# Patient Record
Sex: FEMALE | Race: WHITE | NOT HISPANIC OR LATINO
[De-identification: names, ages, dates, MRNs, and addresses within clinical notes are randomized per-mention and may not be internally consistent; named-entity substitution may affect disease eponyms.]

---

## 2018-09-18 PROBLEM — Z00.00 ENCOUNTER FOR PREVENTIVE HEALTH EXAMINATION: Status: ACTIVE | Noted: 2018-09-18

## 2018-10-02 ENCOUNTER — APPOINTMENT (OUTPATIENT)
Dept: PSYCHIATRY | Facility: CLINIC | Age: 28
End: 2018-10-02
Payer: COMMERCIAL

## 2018-10-02 PROCEDURE — 90791 PSYCH DIAGNOSTIC EVALUATION: CPT

## 2018-10-16 ENCOUNTER — NON-APPOINTMENT (OUTPATIENT)
Age: 28
End: 2018-10-16

## 2018-10-16 ENCOUNTER — APPOINTMENT (OUTPATIENT)
Dept: CARDIOLOGY | Facility: CLINIC | Age: 28
End: 2018-10-16
Payer: MEDICAID

## 2018-10-16 VITALS
BODY MASS INDEX: 19.63 KG/M2 | SYSTOLIC BLOOD PRESSURE: 102 MMHG | HEIGHT: 64 IN | DIASTOLIC BLOOD PRESSURE: 70 MMHG | HEART RATE: 64 BPM | WEIGHT: 115 LBS

## 2018-10-16 DIAGNOSIS — Z87.891 PERSONAL HISTORY OF NICOTINE DEPENDENCE: ICD-10-CM

## 2018-10-16 DIAGNOSIS — Z82.49 FAMILY HISTORY OF ISCHEMIC HEART DISEASE AND OTHER DISEASES OF THE CIRCULATORY SYSTEM: ICD-10-CM

## 2018-10-16 DIAGNOSIS — Z78.9 OTHER SPECIFIED HEALTH STATUS: ICD-10-CM

## 2018-10-16 PROCEDURE — 99203 OFFICE O/P NEW LOW 30 MIN: CPT

## 2018-10-16 PROCEDURE — 93000 ELECTROCARDIOGRAM COMPLETE: CPT

## 2018-10-16 RX ORDER — NORGESTIMATE AND ETHINYL ESTRADIOL 7DAYSX3 28
0.18/0.215/0.25 KIT ORAL
Refills: 0 | Status: ACTIVE | COMMUNITY

## 2018-12-18 DIAGNOSIS — F43.8 OTHER REACTIONS TO SEVERE STRESS: ICD-10-CM

## 2019-01-14 ENCOUNTER — APPOINTMENT (OUTPATIENT)
Dept: CARDIOLOGY | Facility: CLINIC | Age: 29
End: 2019-01-14

## 2019-01-22 ENCOUNTER — APPOINTMENT (OUTPATIENT)
Dept: CARDIOLOGY | Facility: CLINIC | Age: 29
End: 2019-01-22

## 2019-05-16 PROBLEM — F43.8 ADJUSTMENT REACTION TO CHRONIC STRESS: Status: ACTIVE | Noted: 2018-10-02

## 2019-10-15 ENCOUNTER — APPOINTMENT (OUTPATIENT)
Dept: CARDIOLOGY | Facility: CLINIC | Age: 29
End: 2019-10-15
Payer: COMMERCIAL

## 2019-10-15 ENCOUNTER — NON-APPOINTMENT (OUTPATIENT)
Age: 29
End: 2019-10-15

## 2019-10-15 VITALS
DIASTOLIC BLOOD PRESSURE: 60 MMHG | BODY MASS INDEX: 18.78 KG/M2 | SYSTOLIC BLOOD PRESSURE: 108 MMHG | HEIGHT: 64 IN | OXYGEN SATURATION: 98 % | WEIGHT: 110 LBS | HEART RATE: 77 BPM

## 2019-10-15 DIAGNOSIS — Z78.9 OTHER SPECIFIED HEALTH STATUS: ICD-10-CM

## 2019-10-15 PROCEDURE — 99214 OFFICE O/P EST MOD 30 MIN: CPT | Mod: 25

## 2019-10-15 PROCEDURE — 93000 ELECTROCARDIOGRAM COMPLETE: CPT

## 2019-10-15 NOTE — REASON FOR VISIT
[Consultation] : a consultation regarding [FreeTextEntry1] : Historical Perspective:\par 29 year old female with past medical hx of migraines. Pt has had on and off chest pain anteriorly for 3 years.  It has become  more frequent in last 2 months. Chest pain varies in duration and frequency.  Mild 2-3/10, nonradiating. Intensity remains unchanged.  Feels tight or pressure like.  She could have symptoms for few days or then not at all.  It resolves on its own.  It occurs with stress and can occur with climbing stairs.  Also, dyspnea with climbing stairs which resolves quickly. \par \par She also has palpitations.   Described as extra beats or fluttering in chest.   It feels like "dripping sensation down sternum to epigastric area.  It can last minutes and a few episodes have lasted a while.   No associated chest pain, dizziness, syncope.  It does not feel like rapid heart rate. \par \par Current Health Status:\par Patient continues with chest pain and fluttering in her chest. She did not follow through with testing last year. She returns and would like testing.  [FreeTextEntry2] : chest pain

## 2019-10-15 NOTE — PHYSICAL EXAM
[Normal Appearance] : normal appearance [General Appearance - Well Developed] : well developed [General Appearance - Well Nourished] : well nourished [Normal Conjunctiva] : the conjunctiva exhibited no abnormalities [Eyelids - No Xanthelasma] : the eyelids demonstrated no xanthelasmas [No Oral Pallor] : no oral pallor [Normal Oral Mucosa] : normal oral mucosa [No Oral Cyanosis] : no oral cyanosis [Respiration, Rhythm And Depth] : normal respiratory rhythm and effort [FreeTextEntry1] : No JVD, no carotid artery bruits auscultated bilaterally [Auscultation Breath Sounds / Voice Sounds] : lungs were clear to auscultation bilaterally [Exaggerated Use Of Accessory Muscles For Inspiration] : no accessory muscle use [Heart Rate And Rhythm] : heart rate and rhythm were normal [Edema] : no peripheral edema present [Murmurs] : no murmurs present [Heart Sounds] : normal S1 and S2 [Abdomen Mass (___ Cm)] : no abdominal mass palpated [Abnormal Walk] : normal gait [Nail Clubbing] : no clubbing of the fingernails [Gait - Sufficient For Exercise Testing] : the gait was sufficient for exercise testing [Cyanosis, Localized] : no localized cyanosis [Petechial Hemorrhages (___cm)] : no petechial hemorrhages [Skin Turgor] : normal skin turgor [Skin Color & Pigmentation] : normal skin color and pigmentation [] : no rash [Impaired Insight] : insight and judgment were intact [Oriented To Time, Place, And Person] : oriented to person, place, and time [No Anxiety] : not feeling anxious

## 2019-10-15 NOTE — REVIEW OF SYSTEMS
[Lower Ext Edema] : no extremity edema [see HPI] : see HPI [Leg Claudication] : no intermittent leg claudication [Negative] : Heme/Lymph

## 2019-10-15 NOTE — DISCUSSION/SUMMARY
[FreeTextEntry1] : 1. Palpitations: patient with family history of SVT. I am recommending 24 hour Holter Monitor. Recommend echocardiogram to evaluate for structural heart disease. Recommend plain treadmill stress test to evaluate for exercise induced arrhythmias. \par \par 2. Chest Pain: Doubt cardiac etiology, however she states it occurs with exertion at times. Testing stated as above.\par \par Will order blood work. \par \par Follow up after testing.

## 2019-12-05 ENCOUNTER — APPOINTMENT (OUTPATIENT)
Dept: CARDIOLOGY | Facility: CLINIC | Age: 29
End: 2019-12-05
Payer: COMMERCIAL

## 2019-12-05 DIAGNOSIS — R00.2 PALPITATIONS: ICD-10-CM

## 2019-12-05 DIAGNOSIS — R07.9 CHEST PAIN, UNSPECIFIED: ICD-10-CM

## 2019-12-05 PROCEDURE — 93015 CV STRESS TEST SUPVJ I&R: CPT

## 2019-12-05 PROCEDURE — 93306 TTE W/DOPPLER COMPLETE: CPT

## 2019-12-18 ENCOUNTER — APPOINTMENT (OUTPATIENT)
Dept: CARDIOLOGY | Facility: CLINIC | Age: 29
End: 2019-12-18

## 2020-04-30 ENCOUNTER — MESSAGE (OUTPATIENT)
Age: 30
End: 2020-04-30

## 2020-05-02 LAB
SARS-COV-2 IGG SERPL IA-ACNC: 0.8 RATIO
SARS-COV-2 IGG SERPL QL IA: NEGATIVE

## 2022-10-11 ENCOUNTER — NON-APPOINTMENT (OUTPATIENT)
Age: 32
End: 2022-10-11

## 2023-07-07 ENCOUNTER — APPOINTMENT (OUTPATIENT)
Dept: OBGYN | Facility: CLINIC | Age: 33
End: 2023-07-07

## 2023-10-17 ENCOUNTER — TRANSCRIPTION ENCOUNTER (OUTPATIENT)
Age: 33
End: 2023-10-17

## 2024-04-10 ENCOUNTER — APPOINTMENT (OUTPATIENT)
Dept: ORTHOPEDIC SURGERY | Facility: CLINIC | Age: 34
End: 2024-04-10
Payer: COMMERCIAL

## 2024-04-10 ENCOUNTER — APPOINTMENT (OUTPATIENT)
Dept: ORTHOPEDIC SURGERY | Facility: CLINIC | Age: 34
End: 2024-04-10

## 2024-04-10 VITALS — BODY MASS INDEX: 20.14 KG/M2 | HEIGHT: 64 IN | WEIGHT: 118 LBS

## 2024-04-10 DIAGNOSIS — S83.005A UNSPECIFIED DISLOCATION OF LEFT PATELLA, INITIAL ENCOUNTER: ICD-10-CM

## 2024-04-10 PROCEDURE — 99203 OFFICE O/P NEW LOW 30 MIN: CPT

## 2024-04-10 NOTE — PHYSICAL EXAM
[Left] : left knee [NL (0)] : extension 0 degrees [5___] : hamstring 5[unfilled]/5 [] : patient ambulates without assistive device [FreeTextEntry8] : MPFL TENDERNESS [TWNoteComboBox7] : flexion 100 degrees

## 2024-04-10 NOTE — HISTORY OF PRESENT ILLNESS
[Sudden] : sudden [7] : 7 [Dull/Aching] : dull/aching [Throbbing] : throbbing [Constant] : constant [Household chores] : household chores [Leisure] : leisure [Social interactions] : social interactions [Rest] : rest [Full time] : Work status: full time [de-identified] : Patient here for left knee. Patient states she was getting off a sono table and dislocate her knee 1 week ago and popped her knee back in herself. Patient states she has a constant dull aching pain.  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

## 2024-04-10 NOTE — ASSESSMENT
[FreeTextEntry1] : 32YO Female with left knee patellar dislocation and self reduced.   Using patellar taping for support.    -rx for patellar stabilizing brace -Activities as tolerated -Rest, ice, compression, elevation, NSAIDs PRN for pain.  -All questions answered -F/u 4 weeks  The diagnosis was explained in detail. The potential non-surgical and surgical treatments were reviewed. The relative risks and benefits of each option were considered relative to the patients age, activity level, medical history, symptom severity and previously attempted treatments.  The patient was advised to consult with their primary medical provider prior to initiation of any new medications to reduce the risk of adverse effects specific to their long-term home medications and medical history. The risk of gastrointestinal irritation and kidney injury specific to long-term NSAID use was discussed.

## 2024-05-21 ENCOUNTER — APPOINTMENT (OUTPATIENT)
Dept: ORTHOPEDIC SURGERY | Facility: CLINIC | Age: 34
End: 2024-05-21

## 2024-09-30 NOTE — PHYSICAL EXAM
[Normocephalic] : normocephalic [Atraumatic] : atraumatic [EOMI] : extra ocular movement intact [PERRL] : pupils equal, round and reactive to light [Sclera nonicteric] : sclera nonicteric [Conjunctiva pink] : conjunctiva pink [Supple] : supple [No Supraclavicular Adenopathy] : no supraclavicular adenopathy [No Cervical Adenopathy] : no cervical adenopathy [Examined in the supine and seated position] : examined in the supine and seated position [No Axillary Lymphadenopathy] : no left axillary lymphadenopathy [No Edema] : no edema

## 2024-10-01 NOTE — HISTORY OF PRESENT ILLNESS
[FreeTextEntry1] : Problem List:  1. Bloody nipple discharge  Care Team:   HPI: Patient is a 34 year old female presenting for initial consultation on 10/02/24 for right breast bloody nipple discharge. Patient is four months post partum, stopped breast feeding two months ago. Two weeks ago she experiences single episode of spontaneous bloody right nipple discharge.   Breast History: No family history of breast cancer.   Imagin24: Jose: Diagnostic Mammogram Bilateral and Ultrasound Breast Complete: Density D, Impression - No suspicious mammographic or sonographic findings bilaterally. Small right breast cysts. BI-RADS 1

## 2024-10-02 ENCOUNTER — APPOINTMENT (OUTPATIENT)
Dept: SURGERY | Facility: CLINIC | Age: 34
End: 2024-10-02